# Patient Record
Sex: MALE | Race: WHITE | HISPANIC OR LATINO | URBAN - METROPOLITAN AREA
[De-identification: names, ages, dates, MRNs, and addresses within clinical notes are randomized per-mention and may not be internally consistent; named-entity substitution may affect disease eponyms.]

---

## 2022-08-27 ENCOUNTER — EMERGENCY (EMERGENCY)
Facility: HOSPITAL | Age: 42
LOS: 1 days | Discharge: HOME | End: 2022-08-27
Payer: SELF-PAY

## 2022-08-27 VITALS
SYSTOLIC BLOOD PRESSURE: 131 MMHG | DIASTOLIC BLOOD PRESSURE: 78 MMHG | RESPIRATION RATE: 20 BRPM | WEIGHT: 149.91 LBS | OXYGEN SATURATION: 98 % | TEMPERATURE: 98 F | HEIGHT: 64 IN | HEART RATE: 89 BPM

## 2022-08-27 PROCEDURE — 99281 EMR DPT VST MAYX REQ PHY/QHP: CPT

## 2022-08-27 PROCEDURE — L9991: CPT
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
## 2022-08-27 NOTE — ED ADULT TRIAGE NOTE - GLASGOW COMA SCALE: SCORE, MLM
CTA CHEST WITH IV CONTRAST



INDICATION:

Chest pain, shortness of breath, hypoxia.



TECHNIQUE:

Axial CT images were obtained through the chest after injection of IV contrast. 3 plane MIP reconstru
ctions were produced. All CT scans at this location are performed using CT dose reduction for ALARA b
y means of automated exposure control. 



COMPARISON:

Multiple prior chest x-rays, most recently done earlier on 11/30/2019.



FINDINGS:

Pulmonary Arteries: No pulmonary emboli.



Lungs: Multiple large bullae are again seen in both lungs without evidence of pneumothorax. There is 
no consolidation or pleural effusion.

Trachea and Bronchi:  No significant abnormality.



Heart and Pericardium: No significant abnormality.

Vasculature: No significant abnormality.

Lymphatics: No lymphadenopathy.



Additional Findings: None.



Upper Abdomen: No acute findings.



Skeletal Structures: No significant osseous abnormality.



IMPRESSION:

1. No CT evidence for pulmonary embolism. 

2. No acute findings. 

3. Stable appearance of multifocal large bullae in both lungs without evidence of pneumothorax.



Signer Name: Gilberto Hernández MD 

Signed: 11/30/2019 12:21 PM

 Workstation Name: [id number] [id number]

## 2023-12-05 ENCOUNTER — OFFICE VISIT (OUTPATIENT)
Dept: URBAN - METROPOLITAN AREA CLINIC 9 | Facility: CLINIC | Age: 43
End: 2023-12-05